# Patient Record
Sex: FEMALE | Race: WHITE | NOT HISPANIC OR LATINO
[De-identification: names, ages, dates, MRNs, and addresses within clinical notes are randomized per-mention and may not be internally consistent; named-entity substitution may affect disease eponyms.]

---

## 2020-12-21 PROBLEM — Z00.00 ENCOUNTER FOR PREVENTIVE HEALTH EXAMINATION: Status: ACTIVE | Noted: 2020-12-21

## 2020-12-23 ENCOUNTER — APPOINTMENT (OUTPATIENT)
Dept: UROLOGY | Facility: CLINIC | Age: 25
End: 2020-12-23
Payer: COMMERCIAL

## 2020-12-23 VITALS
BODY MASS INDEX: 31.72 KG/M2 | HEIGHT: 61 IN | RESPIRATION RATE: 14 BRPM | TEMPERATURE: 98.1 F | OXYGEN SATURATION: 97 % | WEIGHT: 168 LBS | HEART RATE: 74 BPM | DIASTOLIC BLOOD PRESSURE: 75 MMHG | SYSTOLIC BLOOD PRESSURE: 118 MMHG

## 2020-12-23 DIAGNOSIS — Z80.0 FAMILY HISTORY OF MALIGNANT NEOPLASM OF DIGESTIVE ORGANS: ICD-10-CM

## 2020-12-23 PROCEDURE — 51798 US URINE CAPACITY MEASURE: CPT

## 2020-12-23 PROCEDURE — 99203 OFFICE O/P NEW LOW 30 MIN: CPT | Mod: 25

## 2020-12-23 PROCEDURE — 99072 ADDL SUPL MATRL&STAF TM PHE: CPT

## 2020-12-23 NOTE — REVIEW OF SYSTEMS
[Sexually Transmitted Disease (STD)] : sexually transmitted disease [Date of last menstrual period ____] : date of last menstrual period: [unfilled] [Urine Infection (bladder/kidney)] : bladder/kidney infection [Blood in urine that you can see] : blood visible in urine [Leakage of urine with straining, coughing, laughing] : leakage of urine with straining, coughing, laughing [see HPI] : see HPI [Anxiety] : no anxiety [Depression] : depression [Negative] : Neurological [FreeTextEntry1] : HPV

## 2020-12-23 NOTE — PHYSICAL EXAM
[General Appearance - Well Developed] : well developed [General Appearance - Well Nourished] : well nourished [Normal Appearance] : normal appearance [Well Groomed] : well groomed [General Appearance - In No Acute Distress] : no acute distress [Edema] : no peripheral edema [Respiration, Rhythm And Depth] : normal respiratory rhythm and effort [Exaggerated Use Of Accessory Muscles For Inspiration] : no accessory muscle use [Abdomen Soft] : soft [Abdomen Tenderness] : non-tender [Abdomen Mass (___ Cm)] : no abdominal mass palpated [Abdomen Hernia] : no hernia was discovered [Costovertebral Angle Tenderness] : no ~M costovertebral angle tenderness [FreeTextEntry1] : pvr- zero [Normal Station and Gait] : the gait and station were normal for the patient's age [] : no rash [No Focal Deficits] : no focal deficits [Oriented To Time, Place, And Person] : oriented to person, place, and time [Affect] : the affect was normal [Mood] : the mood was normal [Not Anxious] : not anxious [Cervical Lymph Nodes Enlarged Posterior Bilaterally] : posterior cervical [Cervical Lymph Nodes Enlarged Anterior Bilaterally] : anterior cervical [Supraclavicular Lymph Nodes Enlarged Bilaterally] : supraclavicular

## 2020-12-23 NOTE — ASSESSMENT
[FreeTextEntry1] : patient referred for one urine test that showed : leuks est and 6-10 white cells in setting of 6-10 epithelial cells c/w vagnal contamination \par nothig to do \par however now reports blood n urine yesterday and today with no sxs of uti andmenses 2 weeks away \par george send urine to r/o UTI \par if no UTI=- proceed with furtehr evalof CT and cysto \par if UTI-- treat and repeat urien test

## 2020-12-23 NOTE — HISTORY OF PRESENT ILLNESS
[FreeTextEntry1] : patient referred for a screening urine that had leukocyte esterace and white cells but also with epithelial cells and no bacteria and nitrite negative \par of note she noticed blood in urine just today x 2 with  no hx of recurrent uti and no dysuria \par not sexually active, no bowel issues, takes adequate fluiods, no vaginal or rectal bleeding , no dietary changes\par normal menses

## 2020-12-24 LAB
APPEARANCE: ABNORMAL
BACTERIA: NEGATIVE
BILIRUBIN URINE: NEGATIVE
BLOOD URINE: ABNORMAL
COLOR: YELLOW
GLUCOSE QUALITATIVE U: NEGATIVE
HYALINE CASTS: 0 /LPF
KETONES URINE: NEGATIVE
LEUKOCYTE ESTERASE URINE: NEGATIVE
MICROSCOPIC-UA: NORMAL
NITRITE URINE: NEGATIVE
PH URINE: 8
PROTEIN URINE: ABNORMAL
RED BLOOD CELLS URINE: 3 /HPF
SPECIFIC GRAVITY URINE: 1.02
SQUAMOUS EPITHELIAL CELLS: 9 /HPF
URINE COMMENTS: NORMAL
UROBILINOGEN URINE: NORMAL
WHITE BLOOD CELLS URINE: 1 /HPF

## 2020-12-28 LAB — BACTERIA UR CULT: NORMAL

## 2020-12-30 ENCOUNTER — TRANSCRIPTION ENCOUNTER (OUTPATIENT)
Age: 25
End: 2020-12-30

## 2020-12-31 ENCOUNTER — APPOINTMENT (OUTPATIENT)
Dept: CT IMAGING | Facility: IMAGING CENTER | Age: 25
End: 2020-12-31

## 2021-01-06 ENCOUNTER — NON-APPOINTMENT (OUTPATIENT)
Age: 26
End: 2021-01-06

## 2021-01-07 ENCOUNTER — APPOINTMENT (OUTPATIENT)
Dept: ULTRASOUND IMAGING | Facility: CLINIC | Age: 26
End: 2021-01-07
Payer: COMMERCIAL

## 2021-01-07 ENCOUNTER — OUTPATIENT (OUTPATIENT)
Dept: OUTPATIENT SERVICES | Facility: HOSPITAL | Age: 26
LOS: 1 days | End: 2021-01-07
Payer: COMMERCIAL

## 2021-01-07 DIAGNOSIS — Z96.22 MYRINGOTOMY TUBE(S) STATUS: Chronic | ICD-10-CM

## 2021-01-07 DIAGNOSIS — S62.009A UNSPECIFIED FRACTURE OF NAVICULAR [SCAPHOID] BONE OF UNSPECIFIED WRIST, INITIAL ENCOUNTER FOR CLOSED FRACTURE: Chronic | ICD-10-CM

## 2021-01-07 DIAGNOSIS — Z00.8 ENCOUNTER FOR OTHER GENERAL EXAMINATION: ICD-10-CM

## 2021-01-07 PROCEDURE — 76770 US EXAM ABDO BACK WALL COMP: CPT

## 2021-01-07 PROCEDURE — 76770 US EXAM ABDO BACK WALL COMP: CPT | Mod: 26

## 2021-02-03 ENCOUNTER — APPOINTMENT (OUTPATIENT)
Dept: UROLOGY | Facility: CLINIC | Age: 26
End: 2021-02-03
Payer: COMMERCIAL

## 2021-02-03 PROCEDURE — 52000 CYSTOURETHROSCOPY: CPT

## 2021-02-03 PROCEDURE — 99072 ADDL SUPL MATRL&STAF TM PHE: CPT

## 2021-08-18 ENCOUNTER — APPOINTMENT (OUTPATIENT)
Dept: UROLOGY | Facility: CLINIC | Age: 26
End: 2021-08-18
Payer: COMMERCIAL

## 2021-08-18 VITALS
HEIGHT: 61 IN | SYSTOLIC BLOOD PRESSURE: 119 MMHG | HEART RATE: 64 BPM | TEMPERATURE: 98.3 F | BODY MASS INDEX: 31.72 KG/M2 | DIASTOLIC BLOOD PRESSURE: 82 MMHG | OXYGEN SATURATION: 98 % | WEIGHT: 168 LBS | RESPIRATION RATE: 16 BRPM

## 2021-08-18 DIAGNOSIS — Z87.440 PERSONAL HISTORY OF URINARY (TRACT) INFECTIONS: ICD-10-CM

## 2021-08-18 DIAGNOSIS — Z87.448 PERSONAL HISTORY OF OTHER DISEASES OF URINARY SYSTEM: ICD-10-CM

## 2021-08-18 PROCEDURE — 99214 OFFICE O/P EST MOD 30 MIN: CPT

## 2021-08-18 NOTE — HISTORY OF PRESENT ILLNESS
[FreeTextEntry1] : patien t with hx of hematurai and uti \par sergeyal witgh estephanie : kidney negative for hydro or stones\par incidental fnidng of gall stones--has not seen gen surg fr thid\par urine negtative\par and ctysto ": negative urethra nad bladder\par now here for f/u\par no luts\par no dysuria or hematura\par no inc or pad use\par no bowl issues\par takes avg water ntkae

## 2021-08-18 NOTE — ASSESSMENT
[FreeTextEntry1] : patient with hematurai and utis\par cysto and estephanie : negative\par + gall stones seenand images reviewd with patient \par she was recommended to see gen surg\par george check ua today \par no f/u needed if urine clear

## 2021-08-18 NOTE — PHYSICAL EXAM
[General Appearance - Well Developed] : well developed [General Appearance - Well Nourished] : well nourished [Normal Appearance] : normal appearance [Well Groomed] : well groomed [General Appearance - In No Acute Distress] : no acute distress [Abdomen Soft] : soft [Abdomen Tenderness] : non-tender [] : no hepato-splenomegaly [Abdomen Mass (___ Cm)] : no abdominal mass palpated [Abdomen Hernia] : no hernia was discovered [FreeTextEntry1] : pvr- zero [Costovertebral Angle Tenderness] : no ~M costovertebral angle tenderness

## 2021-08-20 LAB
APPEARANCE: CLEAR
BACTERIA: NEGATIVE
BILIRUBIN URINE: NEGATIVE
BLOOD URINE: NEGATIVE
COLOR: NORMAL
GLUCOSE QUALITATIVE U: NEGATIVE
HYALINE CASTS: 1 /LPF
KETONES URINE: NEGATIVE
LEUKOCYTE ESTERASE URINE: NEGATIVE
MICROSCOPIC-UA: NORMAL
NITRITE URINE: NEGATIVE
PH URINE: 6.5
PROTEIN URINE: NORMAL
RED BLOOD CELLS URINE: 2 /HPF
SPECIFIC GRAVITY URINE: 1.02
SQUAMOUS EPITHELIAL CELLS: 4 /HPF
UROBILINOGEN URINE: NORMAL
WHITE BLOOD CELLS URINE: 7 /HPF

## 2023-04-18 ENCOUNTER — TRANSCRIPTION ENCOUNTER (OUTPATIENT)
Age: 28
End: 2023-04-18

## 2023-04-19 ENCOUNTER — TRANSCRIPTION ENCOUNTER (OUTPATIENT)
Age: 28
End: 2023-04-19

## 2023-04-19 ENCOUNTER — INPATIENT (INPATIENT)
Facility: HOSPITAL | Age: 28
LOS: 0 days | Discharge: HOME | End: 2023-04-19
Attending: SURGERY | Admitting: SURGERY
Payer: COMMERCIAL

## 2023-04-19 ENCOUNTER — RESULT REVIEW (OUTPATIENT)
Age: 28
End: 2023-04-19

## 2023-04-19 VITALS
HEART RATE: 89 BPM | HEIGHT: 61 IN | TEMPERATURE: 98 F | WEIGHT: 190.04 LBS | SYSTOLIC BLOOD PRESSURE: 137 MMHG | RESPIRATION RATE: 16 BRPM | DIASTOLIC BLOOD PRESSURE: 85 MMHG | OXYGEN SATURATION: 97 %

## 2023-04-19 VITALS
HEART RATE: 85 BPM | RESPIRATION RATE: 18 BRPM | DIASTOLIC BLOOD PRESSURE: 71 MMHG | OXYGEN SATURATION: 96 % | SYSTOLIC BLOOD PRESSURE: 122 MMHG | TEMPERATURE: 98 F

## 2023-04-19 DIAGNOSIS — S62.009A UNSPECIFIED FRACTURE OF NAVICULAR [SCAPHOID] BONE OF UNSPECIFIED WRIST, INITIAL ENCOUNTER FOR CLOSED FRACTURE: Chronic | ICD-10-CM

## 2023-04-19 DIAGNOSIS — Z96.22 MYRINGOTOMY TUBE(S) STATUS: Chronic | ICD-10-CM

## 2023-04-19 LAB
ALBUMIN SERPL ELPH-MCNC: 4 G/DL — SIGNIFICANT CHANGE UP (ref 3.3–5)
ALP SERPL-CCNC: 90 U/L — SIGNIFICANT CHANGE UP (ref 40–120)
ALT FLD-CCNC: 26 U/L — SIGNIFICANT CHANGE UP (ref 12–78)
ANION GAP SERPL CALC-SCNC: 3 MMOL/L — LOW (ref 5–17)
APPEARANCE UR: ABNORMAL
APTT BLD: 35.2 SEC — SIGNIFICANT CHANGE UP (ref 27.5–35.5)
AST SERPL-CCNC: 15 U/L — SIGNIFICANT CHANGE UP (ref 15–37)
BACTERIA # UR AUTO: ABNORMAL
BASOPHILS # BLD AUTO: 0.05 K/UL — SIGNIFICANT CHANGE UP (ref 0–0.2)
BASOPHILS NFR BLD AUTO: 0.5 % — SIGNIFICANT CHANGE UP (ref 0–2)
BILIRUB SERPL-MCNC: 0.2 MG/DL — SIGNIFICANT CHANGE UP (ref 0.2–1.2)
BILIRUB UR-MCNC: NEGATIVE — SIGNIFICANT CHANGE UP
BLD GP AB SCN SERPL QL: SIGNIFICANT CHANGE UP
BUN SERPL-MCNC: 12 MG/DL — SIGNIFICANT CHANGE UP (ref 7–23)
CALCIUM SERPL-MCNC: 9 MG/DL — SIGNIFICANT CHANGE UP (ref 8.5–10.1)
CHLORIDE SERPL-SCNC: 105 MMOL/L — SIGNIFICANT CHANGE UP (ref 96–108)
CO2 SERPL-SCNC: 29 MMOL/L — SIGNIFICANT CHANGE UP (ref 22–31)
COLOR SPEC: YELLOW — SIGNIFICANT CHANGE UP
CREAT SERPL-MCNC: 0.84 MG/DL — SIGNIFICANT CHANGE UP (ref 0.5–1.3)
DIFF PNL FLD: NEGATIVE — SIGNIFICANT CHANGE UP
EGFR: 97 ML/MIN/1.73M2 — SIGNIFICANT CHANGE UP
EOSINOPHIL # BLD AUTO: 0.49 K/UL — SIGNIFICANT CHANGE UP (ref 0–0.5)
EOSINOPHIL NFR BLD AUTO: 4.6 % — SIGNIFICANT CHANGE UP (ref 0–6)
EPI CELLS # UR: SIGNIFICANT CHANGE UP
FLUAV AG NPH QL: SIGNIFICANT CHANGE UP
FLUBV AG NPH QL: SIGNIFICANT CHANGE UP
GLUCOSE SERPL-MCNC: 109 MG/DL — HIGH (ref 70–99)
GLUCOSE UR QL: NEGATIVE MG/DL — SIGNIFICANT CHANGE UP
HCG SERPL-ACNC: <1 MIU/ML — SIGNIFICANT CHANGE UP
HCT VFR BLD CALC: 41 % — SIGNIFICANT CHANGE UP (ref 34.5–45)
HGB BLD-MCNC: 13.1 G/DL — SIGNIFICANT CHANGE UP (ref 11.5–15.5)
IMM GRANULOCYTES NFR BLD AUTO: 0.4 % — SIGNIFICANT CHANGE UP (ref 0–0.9)
INR BLD: 1.01 RATIO — SIGNIFICANT CHANGE UP (ref 0.88–1.16)
KETONES UR-MCNC: NEGATIVE — SIGNIFICANT CHANGE UP
LACTATE SERPL-SCNC: 0.9 MMOL/L — SIGNIFICANT CHANGE UP (ref 0.7–2)
LEUKOCYTE ESTERASE UR-ACNC: ABNORMAL
LYMPHOCYTES # BLD AUTO: 2.55 K/UL — SIGNIFICANT CHANGE UP (ref 1–3.3)
LYMPHOCYTES # BLD AUTO: 23.9 % — SIGNIFICANT CHANGE UP (ref 13–44)
MCHC RBC-ENTMCNC: 27.4 PG — SIGNIFICANT CHANGE UP (ref 27–34)
MCHC RBC-ENTMCNC: 32 G/DL — SIGNIFICANT CHANGE UP (ref 32–36)
MCV RBC AUTO: 85.8 FL — SIGNIFICANT CHANGE UP (ref 80–100)
MONOCYTES # BLD AUTO: 0.7 K/UL — SIGNIFICANT CHANGE UP (ref 0–0.9)
MONOCYTES NFR BLD AUTO: 6.6 % — SIGNIFICANT CHANGE UP (ref 2–14)
NEUTROPHILS # BLD AUTO: 6.85 K/UL — SIGNIFICANT CHANGE UP (ref 1.8–7.4)
NEUTROPHILS NFR BLD AUTO: 64 % — SIGNIFICANT CHANGE UP (ref 43–77)
NITRITE UR-MCNC: NEGATIVE — SIGNIFICANT CHANGE UP
NRBC # BLD: 0 /100 WBCS — SIGNIFICANT CHANGE UP (ref 0–0)
PH UR: 7 — SIGNIFICANT CHANGE UP (ref 5–8)
PLATELET # BLD AUTO: 295 K/UL — SIGNIFICANT CHANGE UP (ref 150–400)
POTASSIUM SERPL-MCNC: 3.9 MMOL/L — SIGNIFICANT CHANGE UP (ref 3.5–5.3)
POTASSIUM SERPL-SCNC: 3.9 MMOL/L — SIGNIFICANT CHANGE UP (ref 3.5–5.3)
PROT SERPL-MCNC: 7.8 GM/DL — SIGNIFICANT CHANGE UP (ref 6–8.3)
PROT UR-MCNC: 15 MG/DL
PROTHROM AB SERPL-ACNC: 12.1 SEC — SIGNIFICANT CHANGE UP (ref 10.5–13.4)
RBC # BLD: 4.78 M/UL — SIGNIFICANT CHANGE UP (ref 3.8–5.2)
RBC # FLD: 13.4 % — SIGNIFICANT CHANGE UP (ref 10.3–14.5)
RBC CASTS # UR COMP ASSIST: SIGNIFICANT CHANGE UP /HPF (ref 0–4)
SARS-COV-2 RNA SPEC QL NAA+PROBE: SIGNIFICANT CHANGE UP
SODIUM SERPL-SCNC: 137 MMOL/L — SIGNIFICANT CHANGE UP (ref 135–145)
SP GR SPEC: 1 — LOW (ref 1.01–1.02)
UROBILINOGEN FLD QL: NEGATIVE MG/DL — SIGNIFICANT CHANGE UP
WBC # BLD: 10.68 K/UL — HIGH (ref 3.8–10.5)
WBC # FLD AUTO: 10.68 K/UL — HIGH (ref 3.8–10.5)
WBC UR QL: SIGNIFICANT CHANGE UP

## 2023-04-19 PROCEDURE — 99223 1ST HOSP IP/OBS HIGH 75: CPT | Mod: 57

## 2023-04-19 PROCEDURE — 76700 US EXAM ABDOM COMPLETE: CPT | Mod: 26

## 2023-04-19 PROCEDURE — 93010 ELECTROCARDIOGRAM REPORT: CPT

## 2023-04-19 PROCEDURE — 99285 EMERGENCY DEPT VISIT HI MDM: CPT

## 2023-04-19 PROCEDURE — 47562 LAPAROSCOPIC CHOLECYSTECTOMY: CPT

## 2023-04-19 PROCEDURE — 88304 TISSUE EXAM BY PATHOLOGIST: CPT | Mod: 26

## 2023-04-19 RX ORDER — SERTRALINE 25 MG/1
1 TABLET, FILM COATED ORAL
Refills: 0 | DISCHARGE

## 2023-04-19 RX ORDER — PIPERACILLIN AND TAZOBACTAM 4; .5 G/20ML; G/20ML
3.38 INJECTION, POWDER, LYOPHILIZED, FOR SOLUTION INTRAVENOUS EVERY 8 HOURS
Refills: 0 | Status: DISCONTINUED | OUTPATIENT
Start: 2023-04-19 | End: 2023-04-19

## 2023-04-19 RX ORDER — ACETAMINOPHEN 500 MG
1000 TABLET ORAL ONCE
Refills: 0 | Status: COMPLETED | OUTPATIENT
Start: 2023-04-19 | End: 2023-04-19

## 2023-04-19 RX ORDER — PIPERACILLIN AND TAZOBACTAM 4; .5 G/20ML; G/20ML
3.38 INJECTION, POWDER, LYOPHILIZED, FOR SOLUTION INTRAVENOUS ONCE
Refills: 0 | Status: COMPLETED | OUTPATIENT
Start: 2023-04-19 | End: 2023-04-19

## 2023-04-19 RX ORDER — OXYCODONE HYDROCHLORIDE 5 MG/1
5 TABLET ORAL EVERY 6 HOURS
Refills: 0 | Status: DISCONTINUED | OUTPATIENT
Start: 2023-04-19 | End: 2023-04-19

## 2023-04-19 RX ORDER — ONDANSETRON 8 MG/1
4 TABLET, FILM COATED ORAL ONCE
Refills: 0 | Status: COMPLETED | OUTPATIENT
Start: 2023-04-19 | End: 2023-04-19

## 2023-04-19 RX ORDER — ONDANSETRON 8 MG/1
4 TABLET, FILM COATED ORAL EVERY 6 HOURS
Refills: 0 | Status: DISCONTINUED | OUTPATIENT
Start: 2023-04-19 | End: 2023-04-19

## 2023-04-19 RX ORDER — IBUPROFEN 200 MG
1 TABLET ORAL
Qty: 0 | Refills: 0 | DISCHARGE
Start: 2023-04-19

## 2023-04-19 RX ORDER — ACETAMINOPHEN 500 MG
1000 TABLET ORAL ONCE
Refills: 0 | Status: CANCELLED | OUTPATIENT
Start: 2023-04-20 | End: 2023-04-19

## 2023-04-19 RX ORDER — MORPHINE SULFATE 50 MG/1
1 CAPSULE, EXTENDED RELEASE ORAL EVERY 6 HOURS
Refills: 0 | Status: DISCONTINUED | OUTPATIENT
Start: 2023-04-19 | End: 2023-04-19

## 2023-04-19 RX ORDER — IBUPROFEN 200 MG
1 TABLET ORAL
Qty: 30 | Refills: 0
Start: 2023-04-19

## 2023-04-19 RX ORDER — HYDROMORPHONE HYDROCHLORIDE 2 MG/ML
0.5 INJECTION INTRAMUSCULAR; INTRAVENOUS; SUBCUTANEOUS
Refills: 0 | Status: DISCONTINUED | OUTPATIENT
Start: 2023-04-19 | End: 2023-04-19

## 2023-04-19 RX ORDER — SODIUM CHLORIDE 9 MG/ML
1000 INJECTION, SOLUTION INTRAVENOUS
Refills: 0 | Status: DISCONTINUED | OUTPATIENT
Start: 2023-04-19 | End: 2023-04-19

## 2023-04-19 RX ORDER — MORPHINE SULFATE 50 MG/1
2 CAPSULE, EXTENDED RELEASE ORAL ONCE
Refills: 0 | Status: DISCONTINUED | OUTPATIENT
Start: 2023-04-19 | End: 2023-04-19

## 2023-04-19 RX ORDER — ACETAMINOPHEN 500 MG
1000 TABLET ORAL ONCE
Refills: 0 | Status: DISCONTINUED | OUTPATIENT
Start: 2023-04-19 | End: 2023-04-19

## 2023-04-19 RX ORDER — MORPHINE SULFATE 50 MG/1
2 CAPSULE, EXTENDED RELEASE ORAL EVERY 4 HOURS
Refills: 0 | Status: DISCONTINUED | OUTPATIENT
Start: 2023-04-19 | End: 2023-04-19

## 2023-04-19 RX ORDER — IBUPROFEN 200 MG
600 TABLET ORAL EVERY 6 HOURS
Refills: 0 | Status: DISCONTINUED | OUTPATIENT
Start: 2023-04-19 | End: 2023-04-19

## 2023-04-19 RX ORDER — SODIUM CHLORIDE 9 MG/ML
1000 INJECTION INTRAMUSCULAR; INTRAVENOUS; SUBCUTANEOUS ONCE
Refills: 0 | Status: COMPLETED | OUTPATIENT
Start: 2023-04-19 | End: 2023-04-19

## 2023-04-19 RX ORDER — ENOXAPARIN SODIUM 100 MG/ML
40 INJECTION SUBCUTANEOUS EVERY 12 HOURS
Refills: 0 | Status: DISCONTINUED | OUTPATIENT
Start: 2023-04-19 | End: 2023-04-19

## 2023-04-19 RX ORDER — MORPHINE SULFATE 50 MG/1
2 CAPSULE, EXTENDED RELEASE ORAL EVERY 6 HOURS
Refills: 0 | Status: DISCONTINUED | OUTPATIENT
Start: 2023-04-19 | End: 2023-04-19

## 2023-04-19 RX ADMIN — ONDANSETRON 4 MILLIGRAM(S): 8 TABLET, FILM COATED ORAL at 05:05

## 2023-04-19 RX ADMIN — SODIUM CHLORIDE 1000 MILLILITER(S): 9 INJECTION INTRAMUSCULAR; INTRAVENOUS; SUBCUTANEOUS at 04:08

## 2023-04-19 RX ADMIN — SODIUM CHLORIDE 125 MILLILITER(S): 9 INJECTION, SOLUTION INTRAVENOUS at 09:59

## 2023-04-19 RX ADMIN — SODIUM CHLORIDE 75 MILLILITER(S): 9 INJECTION, SOLUTION INTRAVENOUS at 17:59

## 2023-04-19 RX ADMIN — Medication 1000 MILLIGRAM(S): at 04:19

## 2023-04-19 RX ADMIN — PIPERACILLIN AND TAZOBACTAM 25 GRAM(S): 4; .5 INJECTION, POWDER, LYOPHILIZED, FOR SOLUTION INTRAVENOUS at 15:26

## 2023-04-19 RX ADMIN — MORPHINE SULFATE 2 MILLIGRAM(S): 50 CAPSULE, EXTENDED RELEASE ORAL at 05:20

## 2023-04-19 RX ADMIN — Medication 600 MILLIGRAM(S): at 20:51

## 2023-04-19 RX ADMIN — PIPERACILLIN AND TAZOBACTAM 200 GRAM(S): 4; .5 INJECTION, POWDER, LYOPHILIZED, FOR SOLUTION INTRAVENOUS at 10:01

## 2023-04-19 RX ADMIN — MORPHINE SULFATE 2 MILLIGRAM(S): 50 CAPSULE, EXTENDED RELEASE ORAL at 05:05

## 2023-04-19 RX ADMIN — Medication 400 MILLIGRAM(S): at 04:04

## 2023-04-19 RX ADMIN — SODIUM CHLORIDE 1000 MILLILITER(S): 9 INJECTION INTRAMUSCULAR; INTRAVENOUS; SUBCUTANEOUS at 06:07

## 2023-04-19 NOTE — ED PROVIDER NOTE - PROGRESS NOTE DETAILS
Nate: Received sign out pending surgery assessment for possible wanda.  Plan for admission with concern for cholecystitis.  Stable at this time.

## 2023-04-19 NOTE — ED PROVIDER NOTE - NSICDXFAMILYHX_GEN_ALL_CORE_FT
FAMILY HISTORY:  Father  Still living? Unknown  Family history of hypertension, Age at diagnosis: Age Unknown    Mother  Still living? Yes, Estimated age: Age Unknown  Family history of acute cholecystitis, Age at diagnosis: Age Unknown  Family history of hypertension, Age at diagnosis: Age Unknown    Grandparent  Still living? Unknown  Family history of acute cholecystitis, Age at diagnosis: Age Unknown

## 2023-04-19 NOTE — ED ADULT NURSE REASSESSMENT NOTE - NS ED NURSE REASSESS COMMENT FT1
Received patient at 0710 from off going JUAN ALBERTO Smiley, she is a/o x4 , denies any abdominal pains now, admits that morphine was very effective. she is pending surgical evaluation, she was informed and states understanding.
Pt pain the same. Dr Stephenson made aware, See orders

## 2023-04-19 NOTE — DISCHARGE NOTE NURSING/CASE MANAGEMENT/SOCIAL WORK - PATIENT PORTAL LINK FT
You can access the FollowMyHealth Patient Portal offered by [facility] by registering at the following website: [email or web address]. By joining [facility]’s FollowMyHealth portal, you will also be able to view your health information using other applications (apps) compatible with our system.

## 2023-04-19 NOTE — PATIENT PROFILE ADULT - FALL HARM RISK - UNIVERSAL INTERVENTIONS
Bed in lowest position, wheels locked, appropriate side rails in place/Call bell, personal items and telephone in reach/Instruct patient to call for assistance before getting out of bed or chair/Non-slip footwear when patient is out of bed/[place] to call system/Physically safe environment - no spills, clutter or unnecessary equipment/Purposeful Proactive Rounding/Room/bathroom lighting operational, light cord in reach

## 2023-04-19 NOTE — PROGRESS NOTE ADULT - SUBJECTIVE AND OBJECTIVE BOX
POST-OPERATIVE NOTE    Subjective:  Patient is s/p laparoscopic cholecystectomy . Recovering appropriately.     Vital Signs Last 24 Hrs  T(C): 37.2 (19 Apr 2023 20:05), Max: 37.2 (19 Apr 2023 20:05)  T(F): 98.9 (19 Apr 2023 20:05), Max: 98.9 (19 Apr 2023 20:05)  HR: 73 (19 Apr 2023 20:05) (66 - 89)  BP: 114/72 (19 Apr 2023 20:05) (104/64 - 137/85)  BP(mean): --  RR: 18 (19 Apr 2023 20:05) (16 - 20)  SpO2: 94% (19 Apr 2023 20:05) (94% - 100%)    Parameters below as of 19 Apr 2023 19:05  Patient On (Oxygen Delivery Method): room air      I&O's Detail      PAST MEDICAL & SURGICAL HISTORY:  Ganglion cyst      Bunion      Scaphoid fracture of wrist  left wrist, 2013      Depression, major      Ganglion cyst  h/o Ganglion cyst removal      Scaphoid fracture of wrist  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      History of tympanostomy tube placement  bilateral, child            Physical Exam:  General: NAD, resting comfortably in bed  Pulmonary: Nonlabored breathing, no respiratory distress  Cardiovascular: NSR  Abdominal: soft, minimally tender steri strips in place   Extremities: P      LABS:                        13.1   10.68 )-----------( 295      ( 19 Apr 2023 04:00 )             41.0     04-19    137  |  105  |  12  ----------------------------<  109<H>  3.9   |  29  |  0.84    Ca    9.0      19 Apr 2023 04:00    TPro  7.8  /  Alb  4.0  /  TBili  0.2  /  DBili  x   /  AST  15  /  ALT  26  /  AlkPhos  90  04-19    PT/INR - ( 19 Apr 2023 11:05 )   PT: 12.1 sec;   INR: 1.01 ratio         PTT - ( 19 Apr 2023 11:05 )  PTT:35.2 sec  CAPILLARY BLOOD GLUCOSE          Radiology and Additional Studies:    Assessment:  The patient is a 27y Female who is now several hours post-op from a lap cholecystectomy    Plan:  - Pain control as needed  - DVT ppx  - OOB and ambulating as tolerated  - voiding  - patient does not want narcotics and is cleared for dc home. discussed with Dr. Martinez

## 2023-04-19 NOTE — H&P ADULT - HISTORY OF PRESENT ILLNESS
26 y/o F with PMHx of cholelithiasis p/w acute onset epigastric abdominal pain and chills. Patient reports pain as sharp, constant, radiating to back and shoulders. She previously had incidental finding of cholelithiasis 2.5 years ago on ultrasound that she never followed up with. She has never had similar incidences of postprandial pain in the past. Denies nausea, vomiting, diarrhea, constipation, and fever. Her last meal was 10 PM yesterday 4/19/2023 and her last bowel movement was yesterday evening.  28 y/o F with PMHx of cholelithiasis p/w acute onset epigastric abdominal pain and chills. Patient reports pain as sharp, constant, radiating to back and shoulders, rating it 8/10. Endorses episode of chills earlier this AM.  previously had incidental finding of cholelithiasis 2.5 years ago on ultrasound that she never followed up with. She has never had similar incidences of postprandial pain in the past. Denies nausea, vomiting, diarrhea, constipation, and fever. Her last meal was 10 PM yesterday 4/19/2023 and her last bowel movement was yesterday evening.

## 2023-04-19 NOTE — DISCHARGE NOTE PROVIDER - NSDCMRMEDTOKEN_GEN_ALL_CORE_FT
cephalexin 250 mg/5 mL oral liquid: 10 milliliter(s) orally 4 times a day  ibuprofen 600 mg oral tablet: 1 tab(s) orally every 6 hours As needed Mild Pain (1 - 3)  Zoloft 100 mg oral tablet: 1 orally once a day  Zoloft 25 mg oral tablet: 1 orally once a day   ibuprofen 600 mg oral tablet: 1 tab(s) orally every 6 hours as needed for Mild Pain (1 - 3)  Zoloft 100 mg oral tablet: 1 orally once a day  Zoloft 25 mg oral tablet: 1 orally once a day

## 2023-04-19 NOTE — DISCHARGE NOTE PROVIDER - NSDCFUADDINST_GEN_ALL_CORE_FT
May remove dressings and shower after 48 hours. Do not remove white steri strips as they will fall off on their own.     May take Tylenol and Advil for pain as needed. May take prescribed narcotic pain medicine for severe pain as needed. DO NOT DRIVE OR MAKE IMPORTANT DECISIONS WHEN TAKING NARCOTIC PAIN MEDICATION.    Do not lift more than 15 lbs until cleared to do so by your surgeon.    Please call your surgeon if any questions or concerns are to arise. May remove dressings and shower after 48 hours. Do not remove white steri strips as they will fall off on their own.     Notify your doctor or return to ER if fever, shortness of breath, chest pain, unable to void or pass stool.     May take Tylenol and Advil for pain as needed.     Do not lift more than 15 lbs until cleared to do so by your surgeon.    Please call your surgeon if any questions or concerns are to arise.

## 2023-04-19 NOTE — PATIENT PROFILE ADULT - FUNCTIONAL ASSESSMENT - BASIC MOBILITY ASSESSMENT TYPE
Post-Op Assessment Note    CV Status:  Stable  Pain Score: 2    Pain management: adequate     Mental Status:  Arousable   Hydration Status:  Stable   PONV Controlled:  None   Airway Patency:  Patent   Post Op Vitals Reviewed: Yes      Staff: CRNA           BP      Temp      Pulse     Resp      SpO2
Admission

## 2023-04-19 NOTE — DISCHARGE NOTE PROVIDER - CARE PROVIDER_API CALL
Reji Martinez)  Surgery  [address] [facility], [address]  [place] [id number]  Phone: [phone]  Fax: [phone]  Follow Up Time: 2 weeks

## 2023-04-19 NOTE — H&P ADULT - ASSESSMENT
28 y/o F presented with symptomatic cholelithiasis found to have positive White's sign on exam and US of abdomen with gallbladder wall thickening admitted for acute calculous cholecystitis.    Plan:  - NPO  - IV crystalloid fluids   - IV zosyn  - IV zofran 4 mg PRN for nausea/vomiting  - plan for laparoscopic cholecystectomy     Note authored by Tiny Bradshaw MS3 26 y/o F presented with symptomatic cholelithiasis found to have positive White's sign on exam and US of abdomen with gallbladder wall thickening admitted for acute calculous cholecystitis.    Plan:  - plan for laparoscopic cholecystectomy, consent obtained, booked as add on  - NPO, IV crystalloid fluids   - IV zosyn  - IV zofran 4 mg PRN for nausea/vomiting  - pain control with acetaminophen ATC, morphine PRN  - DVT ppx, OOB/AAT  - discussed with Dr. Martinez    Note authored by Tiny Bradshaw MS3

## 2023-04-19 NOTE — ED ADULT NURSE NOTE - OBJECTIVE STATEMENT
Pt aaox3. PT complaint of epigastric abdominal pain at 8/10. PT sleeping and suddenly felt pain. Pt denies sob/n/v/d/c or any discomfort. Pmhx of HLD but no meds. NKA Lmp 3/20/23

## 2023-04-19 NOTE — ED PROVIDER NOTE - NSICDXPASTSURGICALHX_GEN_ALL_CORE_FT
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

## 2023-04-19 NOTE — ED PROVIDER NOTE - CLINICAL SUMMARY MEDICAL DECISION MAKING FREE TEXT BOX
pt presents today with epigastric pain radiating into her back, was previously diagnosed with gallstones, on exam pt has epigastric pain and abbott sign concerning for acute cholecystitis, pre op labs, pain control, ivf, sono, surgerical consult prn

## 2023-04-19 NOTE — H&P ADULT - NSHPPHYSICALEXAM_GEN_ALL_CORE
PHYSICAL EXAM:  GENERAL: NAD laying in hospital stretcher, obese body habitus  HEAD:  Atraumatic, Normocephalic  EYES: EOMI, PERRLA, conjunctiva and sclera clear  NECK: Supple, No JVD  CHEST/LUNG: Clear to auscultation bilaterally; No wheeze  HEART: Regular rate and rhythm; No murmurs, rubs, or gallops  ABDOMEN: Soft, +epigastric and RUQ abdominal tenderness, mildly distended, positive White's sign; Bowel sounds present, no guarding, no rebound tenderness  EXTREMITIES:  2+ Peripheral Pulses, No clubbing, cyanosis, or edema  PSYCH: AAOx3, speaking in full sentences   NEUROLOGY: non-focal  SKIN: No rashes or lesions PHYSICAL EXAM:  GENERAL: NAD laying in hospital stretcher, obese body habitus  HEAD:  Atraumatic, Normocephalic  EYES: EOMI, PERRLA, conjunctiva and sclera clear  NECK: Supple, No JVD  CHEST/LUNG: no increased work of breathing, nonlabored respirations   HEART: Regular rate and rhythm; No murmurs, rubs, or gallops  ABDOMEN: Soft, +epigastric and RUQ abdominal tenderness, mildly distended, positive White's sign; Bowel sounds present, no guarding, no rebound tenderness  EXTREMITIES:  2+ Peripheral Pulses, No clubbing, cyanosis, or edema  PSYCH: AAOx3, speaking in full sentences   NEUROLOGY: non-focal  SKIN: No rashes or lesions

## 2023-04-19 NOTE — DISCHARGE NOTE NURSING/CASE MANAGEMENT/SOCIAL WORK - NSDCPNINST_GEN_ALL_CORE
Take ibuprofen for pain as needed and as ordered.  Follow up with surgery outpatient.   Monitor surgical sites for bleeding, redness, swelling and report these to your physician if they occur.

## 2023-04-19 NOTE — DISCHARGE NOTE NURSING/CASE MANAGEMENT/SOCIAL WORK - NSDCPEFALRISK_GEN_ALL_CORE
For information on Fall & Injury Prevention, visit: [email or web address] OR  [email or web address] OR  https://www.cdc.gov/steadi/patient.html

## 2023-04-19 NOTE — ED PROVIDER NOTE - NSICDXPASTMEDICALHX_GEN_ALL_CORE_FT
PAST MEDICAL HISTORY:  Bunion     Depression, major     Ganglion cyst     Scaphoid fracture of wrist left wrist, 2013

## 2023-04-19 NOTE — H&P ADULT - NS ATTEND AMEND GEN_ALL_CORE FT
Patient seen and examined  Labs and imaging reviewed  Risks,  benefits, and alternatives to laparoscopic cholecystectomy discussed.  All questions answered.  Admit, NPO, Zosyn, OR planning for laparoscopic cholecystectomy.

## 2023-04-19 NOTE — ED PROVIDER NOTE - OBJECTIVE STATEMENT
27 year old female with h/o cholelithiasis (diagnosed two years ago) presents today c/o epigastric pain, sharp and constant, radiates to the back rated 8/10, pt denies fevers, chills, nausea or vomiting

## 2023-04-19 NOTE — ED ADULT TRIAGE NOTE - CHIEF COMPLAINT QUOTE
Patient c/o RUQ pain radiating to mid back x 2 hours. Patient denies nausea, vomiting. Pt with hx of gallstones. LMP: 3/27/23  pmhx: gallstones,

## 2023-04-19 NOTE — DISCHARGE NOTE PROVIDER - HOSPITAL COURSE
Patient is 27 year old female with no PMHX, presenting with epigastric abdominal pain, found to have acute cholecystitis. Patient underwent laparoscopic cholecystectomy with Dr. Martinez on 4/19/23, tolerated well. At time of discharge, patient was hemodynamically stable, tolerating diet PO, voiding urine, was ambulating, and was comfortable with adequate pain control.

## 2023-04-20 LAB
CULTURE RESULTS: SIGNIFICANT CHANGE UP
SPECIMEN SOURCE: SIGNIFICANT CHANGE UP

## 2023-04-21 LAB — SURGICAL PATHOLOGY STUDY: SIGNIFICANT CHANGE UP

## 2023-04-24 DIAGNOSIS — F32.9 MAJOR DEPRESSIVE DISORDER, SINGLE EPISODE, UNSPECIFIED: ICD-10-CM

## 2023-04-24 DIAGNOSIS — R10.13 EPIGASTRIC PAIN: ICD-10-CM

## 2023-04-24 DIAGNOSIS — K80.00 CALCULUS OF GALLBLADDER WITH ACUTE CHOLECYSTITIS WITHOUT OBSTRUCTION: ICD-10-CM

## 2023-05-08 ENCOUNTER — APPOINTMENT (OUTPATIENT)
Dept: SURGERY | Facility: CLINIC | Age: 28
End: 2023-05-08
Payer: COMMERCIAL

## 2023-05-08 VITALS
WEIGHT: 190 LBS | HEART RATE: 58 BPM | BODY MASS INDEX: 35.87 KG/M2 | TEMPERATURE: 98 F | OXYGEN SATURATION: 96 % | SYSTOLIC BLOOD PRESSURE: 121 MMHG | HEIGHT: 61 IN | DIASTOLIC BLOOD PRESSURE: 79 MMHG

## 2023-05-08 PROCEDURE — 99024 POSTOP FOLLOW-UP VISIT: CPT

## 2023-05-15 NOTE — PHYSICAL EXAM
[No Rash or Lesion] : No rash or lesion [Alert] : alert [Oriented to Person] : oriented to person [Oriented to Place] : oriented to place [Calm] : calm [de-identified] : comfortable, well appearing [de-identified] : no scleral icterus [de-identified] : soft, not tender and not distended\par incisions are healing well without erythema\par

## 2023-05-15 NOTE — HISTORY OF PRESENT ILLNESS
[de-identified] : 27 year old woman s/p laparoscopic cholecystectomy 4/19/2023. \par  [de-identified] : She presents to the office for a follow up visit.\par Denies any nausea, vomiting ,fever or chills.\par Tolerating PO intake with normal GI and  function.\par Ambulating without issues. Not taking an pain medications. \par

## 2023-05-15 NOTE — PLAN
[FreeTextEntry1] : 27 year old woman recovering well after laparoscopic cholecystectomy\par - pathology chronic cholecystitis discussed with patient\par - follow up as needed

## 2024-10-05 NOTE — BRIEF OPERATIVE NOTE - ASSISTANT(S)
Dr. Philipp Carbajal [Consultation] : a consultation [Abnormal CXR/ Chest CT] : an abnormal CXR/ chest CT [TextBox_13] : Dr. Duran

## 2025-09-03 ENCOUNTER — APPOINTMENT (OUTPATIENT)
Dept: ORTHOPEDIC SURGERY | Facility: CLINIC | Age: 30
End: 2025-09-03
Payer: COMMERCIAL

## 2025-09-03 VITALS — HEIGHT: 61 IN | WEIGHT: 180 LBS | BODY MASS INDEX: 33.99 KG/M2

## 2025-09-03 DIAGNOSIS — S43.001A UNSPECIFIED SUBLUXATION OF RIGHT SHOULDER JOINT, INITIAL ENCOUNTER: ICD-10-CM

## 2025-09-03 PROBLEM — M75.41 IMPINGEMENT SYNDROME OF RIGHT SHOULDER: Status: ACTIVE | Noted: 2025-09-03

## 2025-09-03 PROCEDURE — 73030 X-RAY EXAM OF SHOULDER: CPT | Mod: RT

## 2025-09-03 PROCEDURE — 99204 OFFICE O/P NEW MOD 45 MIN: CPT

## 2025-09-03 PROCEDURE — 73010 X-RAY EXAM OF SHOULDER BLADE: CPT | Mod: RT

## 2025-09-03 RX ORDER — NAPROXEN 500 MG/1
500 TABLET ORAL TWICE DAILY
Qty: 30 | Refills: 0 | Status: ACTIVE | COMMUNITY
Start: 2025-09-03 | End: 1900-01-01

## 2025-09-06 ENCOUNTER — APPOINTMENT (OUTPATIENT)
Dept: MRI IMAGING | Facility: CLINIC | Age: 30
End: 2025-09-06
Payer: COMMERCIAL

## 2025-09-06 PROCEDURE — 73221 MRI JOINT UPR EXTREM W/O DYE: CPT | Mod: RT

## 2025-09-10 ENCOUNTER — APPOINTMENT (OUTPATIENT)
Dept: ORTHOPEDIC SURGERY | Facility: CLINIC | Age: 30
End: 2025-09-10
Payer: COMMERCIAL

## 2025-09-10 VITALS — WEIGHT: 180 LBS | HEIGHT: 61 IN | BODY MASS INDEX: 33.99 KG/M2

## 2025-09-10 DIAGNOSIS — M75.41 IMPINGEMENT SYNDROME OF RIGHT SHOULDER: ICD-10-CM

## 2025-09-10 PROCEDURE — 99214 OFFICE O/P EST MOD 30 MIN: CPT

## (undated) DEVICE — FRA-ESU BOVIE FORCE TRIAD T7J19717DX: Type: DURABLE MEDICAL EQUIPMENT

## (undated) DEVICE — D HELP - CLEARVIEW CLEARIFY SYSTEM

## (undated) DEVICE — GLV 7.5 PROTEXIS (WHITE)

## (undated) DEVICE — SOL IRR POUR H2O 500ML

## (undated) DEVICE — CANISTER DISPOSABLE THIN WALL 3000CC

## (undated) DEVICE — TUBING STRYKER PNEUMOCLEAR SMOKE HEAT HUMID

## (undated) DEVICE — ELCTR GROUNDING PAD ADULT COVIDIEN

## (undated) DEVICE — TUBING OLYMPUS INSUFFLATION

## (undated) DEVICE — Device

## (undated) DEVICE — GLV 7 PROTEXIS (WHITE)

## (undated) DEVICE — TUBING HYDRO-SURG PLUS IRRIGATOR W SMOKEVAC & PROBE

## (undated) DEVICE — SYR LUER LOK 20CC

## (undated) DEVICE — SUT VICRYL 0 27" UR-6

## (undated) DEVICE — ENDOCATCH 10MM SPECIMEN POUCH

## (undated) DEVICE — SOL IRR POUR NS 0.9% 1000ML

## (undated) DEVICE — WARMING BLANKET FULL UNDERBODY

## (undated) DEVICE — TROCAR COVIDIEN VERSAPORT BLADELESS OPTICAL 5MM STANDARD

## (undated) DEVICE — VENODYNE/SCD SLEEVE CALF MEDIUM

## (undated) DEVICE — BASIN SET SINGLE

## (undated) DEVICE — POSITIONER STRAP ARMBOARD VELCRO TS-30

## (undated) DEVICE — DRAPE TOWEL BLUE 17" X 24"

## (undated) DEVICE — TIP METZENBAUM SCISSOR MONOPOLAR ENDOCUT (ORANGE)

## (undated) DEVICE — PACK GENERAL LAPAROSCOPY

## (undated) DEVICE — WARMING BLANKET UPPER ADULT

## (undated) DEVICE — PROTECTOR HEEL / ELBOW

## (undated) DEVICE — DRSG STERISTRIPS 0.5 X 4"

## (undated) DEVICE — BLADE SURGICAL #15 CARBON

## (undated) DEVICE — TUBING INSUFFLATION LAP FILTER 10FT

## (undated) DEVICE — TROCAR APPLIED MEDICAL KII BALLOON BLUNT TIP 12MM X 100MM

## (undated) DEVICE — ELCTR BOVIE TIP BLADE INSULATED 2.75" EDGE

## (undated) DEVICE — DRAPE 3/4 SHEET 52X76"

## (undated) DEVICE — ELCTR REM POLYHESIVE PATIENT RETURN ELECTRODE ADULT

## (undated) DEVICE — SUT MONOCRYL 4-0 27" PS-2 UNDYED